# Patient Record
Sex: FEMALE | Race: WHITE | NOT HISPANIC OR LATINO | URBAN - METROPOLITAN AREA
[De-identification: names, ages, dates, MRNs, and addresses within clinical notes are randomized per-mention and may not be internally consistent; named-entity substitution may affect disease eponyms.]

---

## 2023-02-07 ENCOUNTER — TELEPHONE ENCOUNTER (OUTPATIENT)
Dept: URBAN - METROPOLITAN AREA CLINIC 113 | Facility: CLINIC | Age: 43
End: 2023-02-07

## 2023-04-13 ENCOUNTER — TELEPHONE ENCOUNTER (OUTPATIENT)
Dept: URBAN - METROPOLITAN AREA CLINIC 113 | Facility: CLINIC | Age: 43
End: 2023-04-13

## 2023-05-11 ENCOUNTER — OFFICE VISIT (OUTPATIENT)
Dept: URBAN - METROPOLITAN AREA CLINIC 113 | Facility: CLINIC | Age: 43
End: 2023-05-11
Payer: COMMERCIAL

## 2023-05-11 ENCOUNTER — WEB ENCOUNTER (OUTPATIENT)
Dept: URBAN - METROPOLITAN AREA CLINIC 113 | Facility: CLINIC | Age: 43
End: 2023-05-11

## 2023-05-11 VITALS
HEIGHT: 68 IN | SYSTOLIC BLOOD PRESSURE: 158 MMHG | TEMPERATURE: 97.5 F | HEART RATE: 75 BPM | BODY MASS INDEX: 35.19 KG/M2 | RESPIRATION RATE: 18 BRPM | WEIGHT: 232.2 LBS | DIASTOLIC BLOOD PRESSURE: 95 MMHG

## 2023-05-11 DIAGNOSIS — K59.09 CHRONIC CONSTIPATION: ICD-10-CM

## 2023-05-11 DIAGNOSIS — D68.00 VON WILLEBRAND DISEASE: ICD-10-CM

## 2023-05-11 DIAGNOSIS — F50.89 PAGOPHAGIA: ICD-10-CM

## 2023-05-11 DIAGNOSIS — R14.0 ABDOMINAL BLOATING: ICD-10-CM

## 2023-05-11 DIAGNOSIS — D50.9 IRON DEFICIENCY ANEMIA, UNSPECIFIED IRON DEFICIENCY ANEMIA TYPE: ICD-10-CM

## 2023-05-11 DIAGNOSIS — R10.84 GENERALIZED ABDOMINAL PAIN: ICD-10-CM

## 2023-05-11 DIAGNOSIS — K75.81 NASH (NONALCOHOLIC STEATOHEPATITIS): ICD-10-CM

## 2023-05-11 DIAGNOSIS — K51.00 ULCERATIVE PANCOLITIS WITHOUT COMPLICATION: ICD-10-CM

## 2023-05-11 PROBLEM — 444548001: Status: ACTIVE | Noted: 2023-05-11

## 2023-05-11 PROBLEM — 87522002: Status: ACTIVE | Noted: 2023-05-11

## 2023-05-11 PROBLEM — 442685003: Status: ACTIVE | Noted: 2023-05-11

## 2023-05-11 PROBLEM — 14077003: Status: ACTIVE | Noted: 2023-05-11

## 2023-05-11 PROCEDURE — 99244 OFF/OP CNSLTJ NEW/EST MOD 40: CPT | Performed by: STUDENT IN AN ORGANIZED HEALTH CARE EDUCATION/TRAINING PROGRAM

## 2023-05-11 PROCEDURE — 99204 OFFICE O/P NEW MOD 45 MIN: CPT | Performed by: STUDENT IN AN ORGANIZED HEALTH CARE EDUCATION/TRAINING PROGRAM

## 2023-05-11 NOTE — HPI-TODAY'S VISIT:
42-year-old female is referred to Dr. Bhatti by Dr. Willie Hopper for evaluation of Villar, ulcerative colitis and routine colon cancer screening.  A copy of today's visit will be forwarded to the referring provider.  Labs 2023:Unremarkable CMP, normal lipid panel, normal hemoglobin A1c, normal T4, normal H/H, normal platelets, normal TSH, normal WBC.  Patient presents with multiple complaints and an extensive history. She has a history of "severe pan ulcerative colitis." She has been hospitalized with a UC the flare in the past. SHe was previously on Remicade while living in [place]. She was also on azathioprine. She stopped therapy on her own accord at some point. Her last colonoscopy was 2.5 years ago with Dr. Flor in [place], [place]. This was normal. She has remained off of therapy for 10+ years. She may go five days without a bowel movement then have intermittent fecal urgency and diarrhea.  The stools are not typically solid. SHe does defecate mucus often. She will have occasional LUQ and RUQ pains that come and go. She is asymptomatic today. She does have frequent abdominal bloating. This improves with defecation then returns later at night. She has been recommended MiraLAX many times over the years for her constipation however she has yet to take this. She only has occasional bleeding seen on the toilet paper when wiping. This is infrequent.  She was diagnosed with VILLAR with her prior GI. THis was biopsy proven. She states she was also told she has "autoimmune hepatitis" and remembers she was told "it wasn't contagious." She is unaware of a family history of liver disease. Her father  at 50 years old, and she does not know his history. He was an alcoholic and had some form of cancer, but she is not aware if GI in origin.   She has a history of von Willebrand disease. This possibly led to her severe iron deficiency anemia. She required an iron infusion a few months ago. She craves ice all the time. SHe chews ice before bed every night. She believes she currently has low iron.

## 2023-05-13 ENCOUNTER — TELEPHONE ENCOUNTER (OUTPATIENT)
Dept: URBAN - METROPOLITAN AREA CLINIC 113 | Facility: CLINIC | Age: 43
End: 2023-05-13

## 2023-05-17 LAB
A/G RATIO: 1.2
ABSOLUTE BASOPHILS: 50
ABSOLUTE EOSINOPHILS: 181
ABSOLUTE LYMPHOCYTES: 1105
ABSOLUTE MONOCYTES: 521
ABSOLUTE NEUTROPHILS: 2344
ACTIN (SMOOTH MUSCLE) ANTIBODY (IGG): 20
ALBUMIN: 4.2
ALKALINE PHOSPHATASE: 81
ALT (SGPT): 18
AST (SGOT): 16
BASOPHILS: 1.2
BILIRUBIN, TOTAL: 0.6
BUN/CREATININE RATIO: (no result)
BUN: 8
CALCIUM: 9.6
CARBON DIOXIDE, TOTAL: 26
CHLORIDE: 103
CREATININE: 0.76
EGFR: 99
EOSINOPHILS: 4.3
FERRITIN, SERUM: 2
GLOBULIN, TOTAL: 3.4
GLUCOSE: 82
HEMATOCRIT: 24.5
HEMOGLOBIN: 6.5
HEPATITIS B CORE AB TOTAL: (no result)
HEPATITIS B SURFACE AB IMMUNITY, QN: <5
HEPATITIS B SURFACE ANTIGEN: (no result)
HEPATITIS C ANTIBODY: (no result)
IMMUNOGLOBULIN A: 172
IMMUNOGLOBULIN G: 1395
IMMUNOGLOBULIN M: 270
IRON BIND.CAP.(TIBC): 592
IRON SATURATION: 3
IRON: 16
LYMPHOCYTES: 26.3
MCH: 16.3
MCHC: 26.5
MCV: 61.6
MITOGEN-NIL: >10
MONOCYTES: 12.4
MPV: 10
NEUTROPHILS: 55.8
PLATELET COUNT: 353
POTASSIUM: 4.4
PROTEIN, TOTAL: 7.6
QUANTIFERON NIL VALUE: 0.03
QUANTIFERON TB1 AG VALUE: 0.02
QUANTIFERON TB2 AG VALUE: 0.02
QUANTIFERON-TB GOLD PLUS: NEGATIVE
RDW: 18.3
RED BLOOD CELL COUNT: 3.98
SIGNAL TO CUT-OFF: <0.02
SODIUM: 137
WHITE BLOOD CELL COUNT: 4.2

## 2023-05-23 ENCOUNTER — TELEPHONE ENCOUNTER (OUTPATIENT)
Dept: URBAN - METROPOLITAN AREA CLINIC 113 | Facility: CLINIC | Age: 43
End: 2023-05-23

## 2023-06-05 ENCOUNTER — OUT OF OFFICE VISIT (OUTPATIENT)
Dept: URBAN - METROPOLITAN AREA SURGERY CENTER 25 | Facility: SURGERY CENTER | Age: 43
End: 2023-06-05
Payer: COMMERCIAL

## 2023-06-05 ENCOUNTER — TELEPHONE ENCOUNTER (OUTPATIENT)
Dept: URBAN - METROPOLITAN AREA CLINIC 113 | Facility: CLINIC | Age: 43
End: 2023-06-05

## 2023-06-05 ENCOUNTER — CLAIMS CREATED FROM THE CLAIM WINDOW (OUTPATIENT)
Dept: URBAN - METROPOLITAN AREA CLINIC 4 | Facility: CLINIC | Age: 43
End: 2023-06-05
Payer: COMMERCIAL

## 2023-06-05 DIAGNOSIS — K51.00 ULCERATIVE (CHRONIC) PANCOLITIS WITHOUT COMPLICATIONS: ICD-10-CM

## 2023-06-05 DIAGNOSIS — D12.3 BENIGN NEOPLASM OF TRANSVERSE COLON: ICD-10-CM

## 2023-06-05 DIAGNOSIS — K51.00 CHRONIC PANCOLONIC ULCERATIVE COLITIS: ICD-10-CM

## 2023-06-05 DIAGNOSIS — K63.89 OTHER SPECIFIED DISEASES OF INTESTINE: ICD-10-CM

## 2023-06-05 DIAGNOSIS — K63.5 BENIGN COLON POLYP: ICD-10-CM

## 2023-06-05 DIAGNOSIS — K51.919 ULCERATIVE COLITIS, UNSPECIFIED WITH UNSPECIFIED COMPLICATIONS: ICD-10-CM

## 2023-06-05 DIAGNOSIS — D12.5 BENIGN NEOPLASM OF SIGMOID COLON: ICD-10-CM

## 2023-06-05 PROBLEM — 724556004: Status: ACTIVE | Noted: 2023-06-05

## 2023-06-05 PROCEDURE — 45380 COLONOSCOPY AND BIOPSY: CPT | Performed by: STUDENT IN AN ORGANIZED HEALTH CARE EDUCATION/TRAINING PROGRAM

## 2023-06-05 PROCEDURE — 00811 ANES LWR INTST NDSC NOS: CPT | Performed by: ANESTHESIOLOGIST ASSISTANT

## 2023-06-05 PROCEDURE — G8907 PT DOC NO EVENTS ON DISCHARG: HCPCS | Performed by: STUDENT IN AN ORGANIZED HEALTH CARE EDUCATION/TRAINING PROGRAM

## 2023-06-05 PROCEDURE — 88342 IMHCHEM/IMCYTCHM 1ST ANTB: CPT | Performed by: PATHOLOGY

## 2023-06-05 PROCEDURE — 00811 ANES LWR INTST NDSC NOS: CPT | Performed by: ANESTHESIOLOGY

## 2023-06-05 PROCEDURE — 88341 IMHCHEM/IMCYTCHM EA ADD ANTB: CPT | Performed by: PATHOLOGY

## 2023-06-05 PROCEDURE — 88305 TISSUE EXAM BY PATHOLOGIST: CPT | Performed by: PATHOLOGY

## 2023-06-05 PROCEDURE — 45385 COLONOSCOPY W/LESION REMOVAL: CPT | Performed by: STUDENT IN AN ORGANIZED HEALTH CARE EDUCATION/TRAINING PROGRAM

## 2023-06-09 ENCOUNTER — TELEPHONE ENCOUNTER (OUTPATIENT)
Dept: URBAN - METROPOLITAN AREA CLINIC 113 | Facility: CLINIC | Age: 43
End: 2023-06-09

## 2023-06-09 RX ORDER — UPADACITINIB 30 MG/1
1 TABLET TABLET, EXTENDED RELEASE ORAL ONCE A DAY
Qty: 90 TABLET | Refills: 1 | OUTPATIENT
Start: 2023-06-09 | End: 2023-07-09

## 2023-06-09 RX ORDER — UPADACITINIB 45 MG/1
ONE TABLET TABLET, EXTENDED RELEASE ORAL DAILY
Qty: 56 | Refills: 0 | OUTPATIENT
Start: 2023-06-09 | End: 2023-08-04

## 2023-06-15 PROBLEM — 442159003: Status: ACTIVE | Noted: 2023-06-15

## 2023-07-17 ENCOUNTER — OFFICE VISIT (OUTPATIENT)
Dept: URBAN - METROPOLITAN AREA CLINIC 113 | Facility: CLINIC | Age: 43
End: 2023-07-17

## 2023-07-26 ENCOUNTER — LAB OUTSIDE AN ENCOUNTER (OUTPATIENT)
Dept: URBAN - METROPOLITAN AREA CLINIC 113 | Facility: CLINIC | Age: 43
End: 2023-07-26

## 2023-07-31 ENCOUNTER — TELEPHONE ENCOUNTER (OUTPATIENT)
Dept: URBAN - METROPOLITAN AREA CLINIC 113 | Facility: CLINIC | Age: 43
End: 2023-07-31

## 2023-08-01 ENCOUNTER — TELEPHONE ENCOUNTER (OUTPATIENT)
Dept: URBAN - METROPOLITAN AREA CLINIC 113 | Facility: CLINIC | Age: 43
End: 2023-08-01

## 2023-08-01 LAB
(TTG) AB, IGA: <1
(TTG) AB, IGG: <1
ANTIGLIADIN ABS, IGA: <1
GLIADIN (DEAMIDATED) AB (IGA): <1
GLIADIN (DEAMIDATED) AB (IGG): <1
H PYLORI BREATH TEST: DETECTED
H. PYLORI BREATH TEST: DETECTED
HEPATITIS C ANTIBODY: (no result)
IMMUNOGLOBULIN A: 169
IMMUNOGLOBULIN A: 169
IMMUNOGLOBULIN G: 1469
IMMUNOGLOBULIN M: 273
INTERPRETATION: DETECTED
REFLEX TIQ: (no result)
T-TRANSGLUTAMINASE (TTG) IGA: <1

## 2023-08-01 RX ORDER — NITAZOXANIDE 500 MG/1
1 TABLET WITH FOOD TABLET ORAL
Qty: 20 TABLET | Refills: 0 | OUTPATIENT
Start: 2023-08-01 | End: 2023-08-11

## 2023-08-01 RX ORDER — DOXYCYCLINE HYCLATE 100 MG/1
1 CAPSULE CAPSULE, GELATIN COATED ORAL ONCE A DAY
Qty: 10 | Refills: 0 | OUTPATIENT
Start: 2023-08-01 | End: 2023-08-11

## 2023-08-01 RX ORDER — PANTOPRAZOLE SODIUM 40 MG/1
1 TABLET TABLET, DELAYED RELEASE ORAL TWICE DAILY
Qty: 20 | Refills: 0 | OUTPATIENT
Start: 2023-08-01

## 2023-08-01 RX ORDER — LEVOFLOXACIN 250 MG/1
1 TABLET TABLET, FILM COATED ORAL ONCE A DAY
Qty: 10 | Refills: 0 | OUTPATIENT
Start: 2023-08-01 | End: 2023-08-11

## 2023-08-21 ENCOUNTER — TELEPHONE ENCOUNTER (OUTPATIENT)
Dept: URBAN - METROPOLITAN AREA CLINIC 113 | Facility: CLINIC | Age: 43
End: 2023-08-21

## 2023-09-13 ENCOUNTER — TELEPHONE ENCOUNTER (OUTPATIENT)
Dept: URBAN - METROPOLITAN AREA CLINIC 113 | Facility: CLINIC | Age: 43
End: 2023-09-13

## 2023-09-13 ENCOUNTER — OFFICE VISIT (OUTPATIENT)
Dept: URBAN - METROPOLITAN AREA CLINIC 113 | Facility: CLINIC | Age: 43
End: 2023-09-13
Payer: COMMERCIAL

## 2023-09-13 VITALS
BODY MASS INDEX: 37.16 KG/M2 | WEIGHT: 245.2 LBS | SYSTOLIC BLOOD PRESSURE: 146 MMHG | DIASTOLIC BLOOD PRESSURE: 94 MMHG | TEMPERATURE: 97.3 F | HEIGHT: 68 IN | HEART RATE: 69 BPM | RESPIRATION RATE: 18 BRPM

## 2023-09-13 DIAGNOSIS — K51.00 ULCERATIVE (CHRONIC) PANCOLITIS WITHOUT COMPLICATIONS: ICD-10-CM

## 2023-09-13 DIAGNOSIS — A04.8 OTHER SPECIFIED BACTERIAL INTESTINAL INFECTIONS: ICD-10-CM

## 2023-09-13 DIAGNOSIS — D50.0 IRON DEFICIENCY ANEMIA DUE TO CHRONIC BLOOD LOSS: ICD-10-CM

## 2023-09-13 DIAGNOSIS — D68.00 VON WILLEBRAND DISEASE: ICD-10-CM

## 2023-09-13 PROBLEM — 289530006: Status: ACTIVE | Noted: 2023-09-13

## 2023-09-13 PROBLEM — 386692008: Status: ACTIVE | Noted: 2023-09-13

## 2023-09-13 PROCEDURE — 99214 OFFICE O/P EST MOD 30 MIN: CPT | Performed by: STUDENT IN AN ORGANIZED HEALTH CARE EDUCATION/TRAINING PROGRAM

## 2023-09-13 RX ORDER — PANTOPRAZOLE SODIUM 40 MG/1
1 TABLET TABLET, DELAYED RELEASE ORAL TWICE DAILY
Qty: 20 | Refills: 0 | Status: ACTIVE | COMMUNITY
Start: 2023-08-01

## 2023-09-13 RX ORDER — UPADACITINIB 45 MG/1
ONE TABLET TABLET, EXTENDED RELEASE ORAL DAILY
Qty: 56 | Refills: 0
Start: 2023-06-09 | End: 2023-11-23

## 2023-09-13 NOTE — HPI-TODAY'S VISIT:
42-year-old female is referred to Dr. Bhatti by Dr. Willie Hopper for evaluation of Villar, ulcerative colitis and routine colon cancer screening.  A copy of today's visit will be forwarded to the referring provider.  Labs 2023:Unremarkable CMP, normal lipid panel, normal hemoglobin A1c, normal T4, normal H/H, normal platelets, normal TSH, normal WBC.  Patient presents with multiple complaints and an extensive history. She has a history of "severe pan ulcerative colitis." She has been hospitalized with a UC the flare in the past. SHe was previously on Remicade while living in [place]. She was also on azathioprine. She stopped therapy on her own accord at some point. Her last colonoscopy was 2.5 years ago with Dr. Flor in [place], [place]. This was normal. She has remained off of therapy for 10+ years. She may go five days without a bowel movement then have intermittent fecal urgency and diarrhea.  The stools are not typically solid. SHe does defecate mucus often. She will have occasional LUQ and RUQ pains that come and go. She is asymptomatic today. She does have frequent abdominal bloating. This improves with defecation then returns later at night. She has been recommended MiraLAX many times over the years for her constipation however she has yet to take this. She only has occasional bleeding seen on the toilet paper when wiping. This is infrequent.  She was diagnosed with VILLAR with her prior GI. THis was biopsy proven. She states she was also told she has "autoimmune hepatitis" and remembers she was told "it wasn't contagious." She is unaware of a family history of liver disease. Her father  at 50 years old, and she does not know his history. He was an alcoholic and had some form of cancer, but she is not aware if GI in origin.   She has a history of von Willebrand disease. This possibly led to her severe iron deficiency anemia. She required an iron infusion a few months ago. She craves ice all the time. SHe chews ice before bed every night. She believes she currently has low iron.   Interval history, 2023:  Labs 2023:Normal immunoglobulin profile, negative HCV antibody, TIBC 592, low iron 16, low iron saturation 3%, low ferritin to, negative hepatitis B surface antibody, mildly positive ASMA 20, negative QuantiFERON-TB gold plus, normal CMP, hemoglobin 6.5, hematocrit 24.5, MCV 61.6, negative hepatitis B core antibody total, negative hepatitis B surface antigen.   Due to critically low hemoglobin she was sent to the ER.  Regarding her liver enzymes, these were normal and autoimmune work-up was unremarkable with exception of slightly positive ASMA.  We were awaiting GI records to confirm reported history of AIH.  Labs 2023:Normal immunoglobulin panel, negative HCV antibody, negative celiac disease panel, positive H. pylori breath test.  Colonoscopy 2023:Performed without difficulty.  BBPS score 5.  Skin tags found on perianal exam.  TI was normal status post biopsies.  Pathology was normal.  7 mm polyp removed from the transverse colon.  5 mm polyp removed from the sigmoid colon.  Inflammation was found as medium patches surrounded by normal mucosa in the rectum, sigmoid colon, descending colon and transverse colon, graded as Andrew score 2.  When compared to previous exam findings have worsened.  Several biopsies were obtained.  Pathology confirmed chronic active colitis consistent with ulcerative colitis.  Retroflexion was not attempted given inflammatory changes seen within the rectum.  Repeat colonoscopy in 1 year for surveillance.  Plan to reinitiate biologic therapy.  She was to start Rinvoq 45 mg daily for 8 weeks followed by maintenance dose of 30 mg daily.  Due to positive H. pylori breath test she was started on low therapy which was sent on .   She has yet to recieve her Rinvoq medication. She believes there was a miscommunication on the phone when she states her rx was not available at the pharmacy. She has been constipated for the last week. She has yet to start Miralax. She has had some mild BRBPR and LLQ cramping. She is more concerned with her anemia and her heavy menstrual cycles. She presents a photo on her phone showing large clots of blood on a panty liner. She has a history of heavy menstrual cycles. SHe does feel weak and fatigued today. She has had new right knee pain and other arthrlagias. She completed her H pylori treatment. She is not currently on reflux medication.

## 2023-09-14 LAB
A/G RATIO: 1.3
ABSOLUTE BASOPHILS: 70
ABSOLUTE EOSINOPHILS: 389
ABSOLUTE LYMPHOCYTES: 1658
ABSOLUTE MONOCYTES: 578
ABSOLUTE NEUTROPHILS: 2705
ALBUMIN: 4.3
ALKALINE PHOSPHATASE: 106
ALT (SGPT): 67
AST (SGOT): 68
BASOPHILS: 1.3
BILIRUBIN, TOTAL: 0.6
BUN/CREATININE RATIO: (no result)
BUN: 10
C-REACTIVE PROTEIN, QUANT: 7.7
CALCIUM: 9.5
CARBON DIOXIDE, TOTAL: 25
CHLORIDE: 100
CREATININE: 0.76
EGFR: 98
EOSINOPHILS: 7.2
FERRITIN, SERUM: 29
GLOBULIN, TOTAL: 3.3
GLUCOSE: 85
HEMATOCRIT: 40.7
HEMOGLOBIN: 12.5
IRON BIND.CAP.(TIBC): 520
IRON SATURATION: 6
IRON: 33
LYMPHOCYTES: 30.7
MCH: 24.8
MCHC: 30.7
MCV: 80.6
MONOCYTES: 10.7
MPV: 11.1
NEUTROPHILS: 50.1
PLATELET COUNT: 374
POTASSIUM: 4.3
PROTEIN, TOTAL: 7.6
RDW: 15
RED BLOOD CELL COUNT: 5.05
SED RATE BY MODIFIED: (no result)
SODIUM: 136
WHITE BLOOD CELL COUNT: 5.4

## 2023-09-29 ENCOUNTER — TELEPHONE ENCOUNTER (OUTPATIENT)
Dept: URBAN - METROPOLITAN AREA CLINIC 113 | Facility: CLINIC | Age: 43
End: 2023-09-29

## 2023-10-02 ENCOUNTER — TELEPHONE ENCOUNTER (OUTPATIENT)
Dept: URBAN - METROPOLITAN AREA CLINIC 113 | Facility: CLINIC | Age: 43
End: 2023-10-02

## 2023-10-02 RX ORDER — UPADACITINIB 45 MG/1
ONE TABLET TABLET, EXTENDED RELEASE ORAL DAILY
Qty: 56 | Refills: 0
Start: 2023-06-09 | End: 2023-11-29

## 2023-10-16 ENCOUNTER — TELEPHONE ENCOUNTER (OUTPATIENT)
Dept: URBAN - METROPOLITAN AREA CLINIC 113 | Facility: CLINIC | Age: 43
End: 2023-10-16

## 2023-10-16 RX ORDER — UPADACITINIB 30 MG/1
1 TABLET TABLET, EXTENDED RELEASE ORAL ONCE A DAY
Qty: 90 TABLET | Refills: 1
Start: 2023-06-09 | End: 2024-04-13

## 2023-10-17 ENCOUNTER — TELEPHONE ENCOUNTER (OUTPATIENT)
Dept: URBAN - METROPOLITAN AREA CLINIC 113 | Facility: CLINIC | Age: 43
End: 2023-10-17

## 2023-10-17 RX ORDER — FERROUS SULFATE 325(65) MG
1 TABLET TABLET ORAL ONCE A DAY
Qty: 90 | Refills: 3 | OUTPATIENT
Start: 2023-10-19

## 2023-10-17 RX ORDER — UPADACITINIB 45 MG/1
ONE TABLET TABLET, EXTENDED RELEASE ORAL DAILY
Qty: 56 | Refills: 0
Start: 2023-06-09 | End: 2023-12-14

## 2023-10-17 RX ORDER — UPADACITINIB 30 MG/1
1 TABLET TABLET, EXTENDED RELEASE ORAL ONCE A DAY
Qty: 90 TABLET | Refills: 1
Start: 2023-06-09 | End: 2024-04-16

## 2023-10-19 ENCOUNTER — TELEPHONE ENCOUNTER (OUTPATIENT)
Dept: URBAN - METROPOLITAN AREA CLINIC 113 | Facility: CLINIC | Age: 43
End: 2023-10-19

## 2023-10-19 RX ORDER — UPADACITINIB 45 MG/1
ONE TABLET TABLET, EXTENDED RELEASE ORAL DAILY
Qty: 56 | Refills: 0
Start: 2023-06-09 | End: 2023-12-14

## 2023-10-19 RX ORDER — UPADACITINIB 30 MG/1
1 TABLET TABLET, EXTENDED RELEASE ORAL ONCE A DAY
Qty: 90 TABLET | Refills: 1
Start: 2023-06-09 | End: 2024-04-16

## 2023-10-30 ENCOUNTER — TELEPHONE ENCOUNTER (OUTPATIENT)
Dept: URBAN - METROPOLITAN AREA CLINIC 113 | Facility: CLINIC | Age: 43
End: 2023-10-30

## 2023-10-30 RX ORDER — UPADACITINIB 45 MG/1
ONE TABLET TABLET, EXTENDED RELEASE ORAL DAILY
Qty: 56 | Refills: 0
Start: 2023-06-09 | End: 2023-12-26

## 2023-10-30 RX ORDER — UPADACITINIB 30 MG/1
1 TABLET TABLET, EXTENDED RELEASE ORAL ONCE A DAY
Qty: 90 TABLET | Refills: 1
Start: 2023-06-09 | End: 2024-04-28

## 2023-12-27 ENCOUNTER — OFFICE VISIT (OUTPATIENT)
Dept: URBAN - METROPOLITAN AREA CLINIC 113 | Facility: CLINIC | Age: 43
End: 2023-12-27

## 2024-01-11 ENCOUNTER — TELEPHONE ENCOUNTER (OUTPATIENT)
Dept: URBAN - METROPOLITAN AREA CLINIC 113 | Facility: CLINIC | Age: 44
End: 2024-01-11

## 2024-01-11 RX ORDER — UPADACITINIB 30 MG/1
1 TABLET TABLET, EXTENDED RELEASE ORAL ONCE A DAY
Qty: 90 TABLET | Refills: 1
Start: 2023-06-09 | End: 2024-07-10

## 2024-01-15 ENCOUNTER — TELEPHONE ENCOUNTER (OUTPATIENT)
Dept: URBAN - METROPOLITAN AREA CLINIC 113 | Facility: CLINIC | Age: 44
End: 2024-01-15

## 2024-01-23 ENCOUNTER — TELEPHONE ENCOUNTER (OUTPATIENT)
Dept: URBAN - METROPOLITAN AREA CLINIC 113 | Facility: CLINIC | Age: 44
End: 2024-01-23

## 2024-01-29 ENCOUNTER — TELEPHONE ENCOUNTER (OUTPATIENT)
Dept: URBAN - METROPOLITAN AREA CLINIC 113 | Facility: CLINIC | Age: 44
End: 2024-01-29

## 2024-02-22 ENCOUNTER — OV EP (OUTPATIENT)
Dept: URBAN - METROPOLITAN AREA CLINIC 113 | Facility: CLINIC | Age: 44
End: 2024-02-22
Payer: COMMERCIAL

## 2024-02-22 VITALS
SYSTOLIC BLOOD PRESSURE: 167 MMHG | TEMPERATURE: 97.6 F | HEART RATE: 65 BPM | BODY MASS INDEX: 38.34 KG/M2 | DIASTOLIC BLOOD PRESSURE: 111 MMHG | WEIGHT: 253 LBS | RESPIRATION RATE: 16 BRPM | HEIGHT: 68 IN

## 2024-02-22 DIAGNOSIS — D68.00 VON WILLEBRAND DISEASE: ICD-10-CM

## 2024-02-22 DIAGNOSIS — K75.81 NASH (NONALCOHOLIC STEATOHEPATITIS): ICD-10-CM

## 2024-02-22 DIAGNOSIS — K51.00 ULCERATIVE (CHRONIC) PANCOLITIS WITHOUT COMPLICATIONS: ICD-10-CM

## 2024-02-22 PROCEDURE — 99214 OFFICE O/P EST MOD 30 MIN: CPT | Performed by: STUDENT IN AN ORGANIZED HEALTH CARE EDUCATION/TRAINING PROGRAM

## 2024-02-22 RX ORDER — UPADACITINIB 30 MG/1
1 TABLET TABLET, EXTENDED RELEASE ORAL ONCE A DAY
Qty: 90 TABLET | Refills: 1 | Status: ON HOLD | COMMUNITY
Start: 2023-06-09 | End: 2024-07-30

## 2024-02-22 RX ORDER — PANTOPRAZOLE SODIUM 40 MG/1
1 TABLET TABLET, DELAYED RELEASE ORAL TWICE DAILY
Qty: 20 | Refills: 0 | Status: ON HOLD | COMMUNITY
Start: 2023-08-01

## 2024-02-22 RX ORDER — FERROUS SULFATE 325(65) MG
1 TABLET TABLET ORAL ONCE A DAY
Qty: 90 | Refills: 3 | Status: ON HOLD | COMMUNITY
Start: 2023-10-19

## 2024-02-22 NOTE — HPI-TODAY'S VISIT:
42-year-old female is referred to Dr. Rodriguez by Dr. Willie Hopper for evaluation of Villar, ulcerative colitis and routine colon cancer screening.  A copy of today's visit will be forwarded to the referring provider. Labs 2023:Unremarkable CMP, normal lipid panel, normal hemoglobin A1c, normal T4, normal H/H, normal platelets, normal TSH, normal WBC. Patient presents with multiple complaints and an extensive history. She has a history of "severe pan ulcerative colitis." She has been hospitalized with a UC the flare in the past. SHe was previously on Remicade while living in [place]. She was also on azathioprine. She stopped therapy on her own accord at some point. Her last colonoscopy was 2.5 years ago with Dr. Flor in [place], [place]. This was normal. She has remained off of therapy for 10+ years. She may go five days without a bowel movement then have intermittent fecal urgency and diarrhea.  The stools are not typically solid. SHe does defecate mucus often. She will have occasional LUQ and RUQ pains that come and go. She is asymptomatic today. She does have frequent abdominal bloating. This improves with defecation then returns later at night. She has been recommended MiraLAX many times over the years for her constipation however she has yet to take this. She only has occasional bleeding seen on the toilet paper when wiping. This is infrequent. She was diagnosed with VILLAR with her prior GI. THis was biopsy proven. She states she was also told she has "autoimmune hepatitis" and remembers she was told "it wasn't contagious." She is unaware of a family history of liver disease. Her father  at 50 years old, and she does not know his history. He was an alcoholic and had some form of cancer, but she is not aware if GI in origin. She has a history of von Willebrand disease. This possibly led to her severe iron deficiency anemia. She required an iron infusion a few months ago. She craves ice all the time. SHe chews ice before bed every night. She believes she currently has low iron. . Interval history, 2023: Labs 2023:Normal immunoglobulin profile, negative HCV antibody, TIBC 592, low iron 16, low iron saturation 3%, low ferritin to, negative hepatitis B surface antibody, mildly positive ASMA 20, negative QuantiFERON-TB gold plus, normal CMP, hemoglobin 6.5, hematocrit 24.5, MCV 61.6, negative hepatitis B core antibody total, negative hepatitis B surface antigen. Due to critically low hemoglobin she was sent to the ER.  Regarding her liver enzymes, these were normal and autoimmune work-up was unremarkable with exception of slightly positive ASMA.  We were awaiting GI records to confirm reported history of AIH. Labs 2023:Normal immunoglobulin panel, negative HCV antibody, negative celiac disease panel, positive H. pylori breath test. Colonoscopy 2023:Performed without difficulty.  BBPS score 5.  Skin tags found on perianal exam.  TI was normal status post biopsies.  Pathology was normal.  7 mm polyp removed from the transverse colon.  5 mm polyp removed from the sigmoid colon.  Inflammation was found as medium patches surrounded by normal mucosa in the rectum, sigmoid colon, descending colon and transverse colon, graded as Andrew score 2.  When compared to previous exam findings have worsened.  Several biopsies were obtained.  Pathology confirmed chronic active colitis consistent with ulcerative colitis.  Retroflexion was not attempted given inflammatory changes seen within the rectum.  Repeat colonoscopy in 1 year for surveillance.  Plan to reinitiate biologic therapy. She was to start Rinvoq 45 mg daily for 8 weeks followed by maintenance dose of 30 mg daily. Due to positive H. pylori breath test she was started on low therapy which was sent on . She has yet to recieve her Rinvoq medication. She believes there was a miscommunication on the phone when she states her rx was not available at the pharmacy. She has been constipated for the last week. She has yet to start Miralax. She has had some mild BRBPR and LLQ cramping. She is more concerned with her anemia and her heavy menstrual cycles. She presents a photo on her phone showing large clots of blood on a panty liner. She has a history of heavy menstrual cycles. SHe does feel weak and fatigued today. She has had new right knee pain and other arthrlagias. She completed her H pylori treatment. She is not currently on reflux medication. . Follow-up visit 2024 Labs 2023: Total bilirubin 0.6, alkaline phosphatase 106, AST 68, ALT 67, BUN 10, creatinine 0.76, ferritin 29, ESR and CRP negative, WBC 5.4, hemoglobin 12.5, platelet 374.She was on Rinvoq for 2 to 3 months but then was told that it was no longer approved.  She has been off of it for the last 2 to 3 months.  On rinvoq she did notice improvement in her mucoid discharge.  At this time she denies rectal bleeding, she is having formed bowel movements.

## 2024-06-05 ENCOUNTER — TELEPHONE ENCOUNTER (OUTPATIENT)
Dept: URBAN - METROPOLITAN AREA CLINIC 113 | Facility: CLINIC | Age: 44
End: 2024-06-05

## 2024-06-07 ENCOUNTER — TELEPHONE ENCOUNTER (OUTPATIENT)
Dept: URBAN - METROPOLITAN AREA CLINIC 113 | Facility: CLINIC | Age: 44
End: 2024-06-07

## 2024-06-13 ENCOUNTER — TELEPHONE ENCOUNTER (OUTPATIENT)
Dept: URBAN - METROPOLITAN AREA CLINIC 113 | Facility: CLINIC | Age: 44
End: 2024-06-13

## 2025-02-18 ENCOUNTER — OFFICE VISIT (OUTPATIENT)
Dept: URBAN - METROPOLITAN AREA CLINIC 113 | Facility: CLINIC | Age: 45
End: 2025-02-18
Payer: COMMERCIAL

## 2025-02-18 ENCOUNTER — DASHBOARD ENCOUNTERS (OUTPATIENT)
Age: 45
End: 2025-02-18

## 2025-02-18 VITALS
DIASTOLIC BLOOD PRESSURE: 104 MMHG | HEART RATE: 79 BPM | TEMPERATURE: 97.9 F | SYSTOLIC BLOOD PRESSURE: 166 MMHG | WEIGHT: 267 LBS | RESPIRATION RATE: 18 BRPM | BODY MASS INDEX: 40.47 KG/M2 | HEIGHT: 68 IN

## 2025-02-18 DIAGNOSIS — K75.81 NASH (NONALCOHOLIC STEATOHEPATITIS): ICD-10-CM

## 2025-02-18 DIAGNOSIS — K51.00 ULCERATIVE (CHRONIC) PANCOLITIS WITHOUT COMPLICATIONS: ICD-10-CM

## 2025-02-18 DIAGNOSIS — D68.00 VON WILLEBRAND DISEASE: ICD-10-CM

## 2025-02-18 PROCEDURE — 99213 OFFICE O/P EST LOW 20 MIN: CPT | Performed by: STUDENT IN AN ORGANIZED HEALTH CARE EDUCATION/TRAINING PROGRAM

## 2025-02-18 RX ORDER — PANTOPRAZOLE SODIUM 40 MG/1
1 TABLET TABLET, DELAYED RELEASE ORAL TWICE DAILY
Qty: 20 | Refills: 0 | Status: ON HOLD | COMMUNITY
Start: 2023-08-01

## 2025-02-18 RX ORDER — FERROUS SULFATE 325(65) MG
1 TABLET TABLET ORAL ONCE A DAY
Qty: 90 | Refills: 3 | Status: ON HOLD | COMMUNITY
Start: 2023-10-19

## 2025-02-18 RX ORDER — METFORMIN HCL 500 MG/1
TABLET ORAL
Qty: 90 TABLET | Status: ACTIVE | COMMUNITY

## 2025-02-18 RX ORDER — UPADACITINIB 30 MG/1
1 TABLET TABLET, EXTENDED RELEASE ORAL ONCE A DAY
Qty: 90 TABLET | Refills: 11
Start: 2023-06-09 | End: 2028-02-03

## 2025-02-18 NOTE — HPI-TODAY'S VISIT:
42-year-old female is referred to Dr. Rodriguez by Dr. Willie Hopper for evaluation of Villar, ulcerative colitis and routine colon cancer screening.  A copy of today's visit will be forwarded to the referring provider. Labs 2023:Unremarkable CMP, normal lipid panel, normal hemoglobin A1c, normal T4, normal H/H, normal platelets, normal TSH, normal WBC. Patient presents with multiple complaints and an extensive history. She has a history of "severe pan ulcerative colitis." She has been hospitalized with a UC the flare in the past. SHe was previously on Remicade while living in [place]. She was also on azathioprine. She stopped therapy on her own accord at some point. Her last colonoscopy was 2.5 years ago with Dr. Flor in [place], [place]. This was normal. She has remained off of therapy for 10+ years. She may go five days without a bowel movement then have intermittent fecal urgency and diarrhea.  The stools are not typically solid. SHe does defecate mucus often. She will have occasional LUQ and RUQ pains that come and go. She is asymptomatic today. She does have frequent abdominal bloating. This improves with defecation then returns later at night. She has been recommended MiraLAX many times over the years for her constipation however she has yet to take this. She only has occasional bleeding seen on the toilet paper when wiping. This is infrequent. She was diagnosed with VILLAR with her prior GI. THis was biopsy proven. She states she was also told she has "autoimmune hepatitis" and remembers she was told "it wasn't contagious." She is unaware of a family history of liver disease. Her father  at 50 years old, and she does not know his history. He was an alcoholic and had some form of cancer, but she is not aware if GI in origin. She has a history of von Willebrand disease. This possibly led to her severe iron deficiency anemia. She required an iron infusion a few months ago. She craves ice all the time. SHe chews ice before bed every night. She believes she currently has low iron. . Interval history, 2023: Labs 2023:Normal immunoglobulin profile, negative HCV antibody, TIBC 592, low iron 16, low iron saturation 3%, low ferritin to, negative hepatitis B surface antibody, mildly positive ASMA 20, negative QuantiFERON-TB gold plus, normal CMP, hemoglobin 6.5, hematocrit 24.5, MCV 61.6, negative hepatitis B core antibody total, negative hepatitis B surface antigen. Due to critically low hemoglobin she was sent to the ER.  Regarding her liver enzymes, these were normal and autoimmune work-up was unremarkable with exception of slightly positive ASMA.  We were awaiting GI records to confirm reported history of AIH. Labs 2023:Normal immunoglobulin panel, negative HCV antibody, negative celiac disease panel, positive H. pylori breath test. Colonoscopy 2023:Performed without difficulty.  BBPS score 5.  Skin tags found on perianal exam.  TI was normal status post biopsies.  Pathology was normal.  7 mm polyp removed from the transverse colon.  5 mm polyp removed from the sigmoid colon.  Inflammation was found as medium patches surrounded by normal mucosa in the rectum, sigmoid colon, descending colon and transverse colon, graded as Andrew score 2.  When compared to previous exam findings have worsened.  Several biopsies were obtained.  Pathology confirmed chronic active colitis consistent with ulcerative colitis.  Retroflexion was not attempted given inflammatory changes seen within the rectum.  Repeat colonoscopy in 1 year for surveillance.  Plan to reinitiate biologic therapy. She was to start Rinvoq 45 mg daily for 8 weeks followed by maintenance dose of 30 mg daily. Due to positive H. pylori breath test she was started on low therapy which was sent on . She has yet to recieve her Rinvoq medication. She believes there was a miscommunication on the phone when she states her rx was not available at the pharmacy. She has been constipated for the last week. She has yet to start Miralax. She has had some mild BRBPR and LLQ cramping. She is more concerned with her anemia and her heavy menstrual cycles. She presents a photo on her phone showing large clots of blood on a panty liner. She has a history of heavy menstrual cycles. SHe does feel weak and fatigued today. She has had new right knee pain and other arthrlagias. She completed her H pylori treatment. She is not currently on reflux medication. . Follow-up visit 2024 Labs 2023: Total bilirubin 0.6, alkaline phosphatase 106, AST 68, ALT 67, BUN 10, creatinine 0.76, ferritin 29, ESR and CRP negative, WBC 5.4, hemoglobin 12.5, platelet 374.She was on Rinvoq for 2 to 3 months but then was told that it was no longer approved.  She has been off of it for the last 2 to 3 months.  On rinvoq she did notice improvement in her mucoid discharge.  At this time she denies rectal bleeding, she is having formed bowel movements. . Follow up visit 25 She had been on Rinvoq until the beginning of the year, her insurance requires a new prescription to be sent. She notes that while on Rinvoq her bowel movement frequency decreases however she still wears adult diapers as she has had episodes of fecal incontinence related to fecal urgency. Her bowel movement frequency is sporadic, ranging from none to more than 4 in a day. She reports bleeding in less than 50% of her bowel movements. She does have lower abdominal crampy pain. She has been off Rinvoq now for approximately 1.5 months.

## 2025-02-28 ENCOUNTER — TELEPHONE ENCOUNTER (OUTPATIENT)
Dept: URBAN - METROPOLITAN AREA CLINIC 113 | Facility: CLINIC | Age: 45
End: 2025-02-28

## 2025-07-09 ENCOUNTER — TELEPHONE ENCOUNTER (OUTPATIENT)
Dept: URBAN - METROPOLITAN AREA CLINIC 6 | Facility: CLINIC | Age: 45
End: 2025-07-09

## 2025-08-01 ENCOUNTER — OFFICE VISIT (OUTPATIENT)
Dept: URBAN - METROPOLITAN AREA CLINIC 113 | Facility: CLINIC | Age: 45
End: 2025-08-01
Payer: COMMERCIAL

## 2025-08-01 ENCOUNTER — LAB OUTSIDE AN ENCOUNTER (OUTPATIENT)
Dept: URBAN - METROPOLITAN AREA CLINIC 113 | Facility: CLINIC | Age: 45
End: 2025-08-01

## 2025-08-01 DIAGNOSIS — D50.0 IRON DEFICIENCY ANEMIA SECONDARY TO BLOOD LOSS (CHRONIC): ICD-10-CM

## 2025-08-01 DIAGNOSIS — K51.00 ULCERATIVE (CHRONIC) PANCOLITIS WITHOUT COMPLICATIONS: ICD-10-CM

## 2025-08-01 PROCEDURE — 99214 OFFICE O/P EST MOD 30 MIN: CPT | Performed by: NURSE PRACTITIONER

## 2025-08-01 RX ORDER — SODIUM, POTASSIUM,MAG SULFATES 17.5-3.13G
177ML AS DIRECTED SOLUTION, RECONSTITUTED, ORAL ORAL
Qty: 1 KIT | Refills: 0 | OUTPATIENT
Start: 2025-08-01 | End: 2025-08-03

## 2025-08-01 RX ORDER — SEMAGLUTIDE 0.68 MG/ML
AS DIRECTED INJECTION, SOLUTION SUBCUTANEOUS
Status: ACTIVE | COMMUNITY

## 2025-08-01 RX ORDER — IRON FUM,PS CMP/VIT C/NIACIN 125-40-3MG
1 CAPSULE BETWEEN MEALS CAPSULE ORAL ONCE A DAY
Status: ACTIVE | COMMUNITY

## 2025-08-01 RX ORDER — METFORMIN HCL 500 MG/1
TABLET ORAL
Qty: 90 TABLET | Status: ACTIVE | COMMUNITY

## 2025-08-01 RX ORDER — FERROUS SULFATE 325(65) MG
1 TABLET TABLET ORAL ONCE A DAY
Qty: 90 | Refills: 3 | Status: ON HOLD | COMMUNITY
Start: 2023-10-19

## 2025-08-01 RX ORDER — UPADACITINIB 30 MG/1
1 TABLET TABLET, EXTENDED RELEASE ORAL ONCE A DAY
Qty: 90 TABLET | Refills: 11 | Status: ON HOLD | COMMUNITY
Start: 2023-06-09 | End: 2028-02-03

## 2025-08-01 RX ORDER — TOPIRAMATE 25 MG/1
1 TABLET TABLET, FILM COATED ORAL DAILY
Status: ACTIVE | COMMUNITY

## 2025-08-01 RX ORDER — LOSARTAN POTASSIUM 25 MG/1
1 TABLET TABLET, FILM COATED ORAL DAILY
Status: ACTIVE | COMMUNITY

## 2025-08-01 RX ORDER — PANTOPRAZOLE SODIUM 40 MG/1
1 TABLET TABLET, DELAYED RELEASE ORAL TWICE DAILY
Qty: 20 | Refills: 0 | Status: ON HOLD | COMMUNITY
Start: 2023-08-01

## 2025-08-05 ENCOUNTER — TELEPHONE ENCOUNTER (OUTPATIENT)
Dept: URBAN - METROPOLITAN AREA CLINIC 113 | Facility: CLINIC | Age: 45
End: 2025-08-05

## 2025-08-06 LAB
A/G RATIO: 1.5
ABSOLUTE BASOPHILS: 49
ABSOLUTE EOSINOPHILS: 220
ABSOLUTE LYMPHOCYTES: 847
ABSOLUTE MONOCYTES: 429
ABSOLUTE NEUTROPHILS: 2253
ALBUMIN: 4.6
ALKALINE PHOSPHATASE: 96
ALT (SGPT): 55
AST (SGOT): 51
BASOPHILS: 1.3
BILIRUBIN, TOTAL: 0.9
BUN/CREATININE RATIO: (no result)
BUN: 7
C-REACTIVE PROTEIN, QUANT: 3.7
CALCIUM: 9.7
CARBON DIOXIDE, TOTAL: 25
CBC MORPHOLOGY: (no result)
CHLORIDE: 103
CREATININE: 0.7
EGFR: 108
EOSINOPHILS: 5.8
FERRITIN, SERUM: 30
GLOBULIN, TOTAL: 3.1
GLUCOSE: 113
HEMATOCRIT: 40.8
HEMOGLOBIN: 12.4
HEPATITIS B SURFACE ANTIGEN: (no result)
IRON BIND.CAP.(TIBC): 425
IRON SATURATION: 26
IRON: 111
LYMPHOCYTES: 22.3
MCH: 23.7
MCHC: 30.4
MCV: 78
MITOGEN-NIL: 7.37
MONOCYTES: 11.3
MPV: 11.5
NEUTROPHILS: 59.3
PLATELET COUNT: 292
POTASSIUM: 3.6
PROTEIN, TOTAL: 7.7
QUANTIFERON NIL VALUE: 0.02
QUANTIFERON TB1 AG VALUE: 0
QUANTIFERON TB2 AG VALUE: 0
QUANTIFERON-TB GOLD PLUS: NEGATIVE
RDW: (no result)
RED BLOOD CELL COUNT: 5.23
SODIUM: 138
WHITE BLOOD CELL COUNT: 3.8

## 2025-08-22 ENCOUNTER — OFFICE VISIT (OUTPATIENT)
Dept: URBAN - METROPOLITAN AREA MEDICAL CENTER 19 | Facility: MEDICAL CENTER | Age: 45
End: 2025-08-22